# Patient Record
Sex: MALE | URBAN - METROPOLITAN AREA
[De-identification: names, ages, dates, MRNs, and addresses within clinical notes are randomized per-mention and may not be internally consistent; named-entity substitution may affect disease eponyms.]

---

## 2017-10-02 ENCOUNTER — IMPORTED ENCOUNTER (OUTPATIENT)
Dept: URBAN - METROPOLITAN AREA CLINIC 59 | Facility: CLINIC | Age: 47
End: 2017-10-02

## 2017-10-02 PROBLEM — H53.002 UNSPECIFIED AMBLYOPIA, LEFT EYE: Noted: 2017-10-02

## 2017-10-02 PROBLEM — H50.812 DUANE'S SYNDROME, LEFT EYE: Noted: 2017-10-02

## 2017-10-02 PROCEDURE — 92004 COMPRE OPH EXAM NEW PT 1/>: CPT

## 2023-10-14 ASSESSMENT — VISUAL ACUITY
OD_CC: 20/25
OS_CC: 20/40

## 2025-05-02 ENCOUNTER — PRE-ADMISSION TESTING (OUTPATIENT)
Dept: PREADMISSION TESTING | Age: 55
End: 2025-05-02
Payer: COMMERCIAL

## 2025-05-02 VITALS — HEIGHT: 60 IN | WEIGHT: 204 LBS | BODY MASS INDEX: 40.05 KG/M2

## 2025-05-02 RX ORDER — ARIPIPRAZOLE 2 MG/1
2.5 TABLET ORAL NIGHTLY
COMMUNITY

## 2025-05-02 RX ORDER — BUSPIRONE HYDROCHLORIDE 5 MG/1
5 TABLET ORAL 3 TIMES DAILY
COMMUNITY

## 2025-05-02 RX ORDER — LANOLIN ALCOHOL/MO/W.PET/CERES
400 CREAM (GRAM) TOPICAL DAILY
COMMUNITY

## 2025-05-02 RX ORDER — ACETAMINOPHEN 500 MG
125 TABLET ORAL DAILY
COMMUNITY

## 2025-05-02 RX ORDER — ASCORBIC ACID 500 MG
500 TABLET ORAL DAILY
COMMUNITY

## 2025-05-02 RX ORDER — ESCITALOPRAM OXALATE 20 MG/1
20 TABLET ORAL DAILY
COMMUNITY

## 2025-05-02 RX ORDER — FENOFIBRATE 67 MG/1
67 CAPSULE ORAL
COMMUNITY

## 2025-05-07 ENCOUNTER — APPOINTMENT (OUTPATIENT)
Dept: RADIOLOGY | Facility: HOSPITAL | Age: 55
Setting detail: HOSPITAL OUTPATIENT SURGERY
End: 2025-05-07
Attending: STUDENT IN AN ORGANIZED HEALTH CARE EDUCATION/TRAINING PROGRAM
Payer: COMMERCIAL

## 2025-05-07 ENCOUNTER — ANESTHESIA (OUTPATIENT)
Dept: OPERATING ROOM | Facility: HOSPITAL | Age: 55
Setting detail: HOSPITAL OUTPATIENT SURGERY
End: 2025-05-07
Payer: COMMERCIAL

## 2025-05-07 ENCOUNTER — HOSPITAL ENCOUNTER (OUTPATIENT)
Facility: HOSPITAL | Age: 55
Setting detail: HOSPITAL OUTPATIENT SURGERY
Discharge: HOME | End: 2025-05-07
Attending: STUDENT IN AN ORGANIZED HEALTH CARE EDUCATION/TRAINING PROGRAM | Admitting: STUDENT IN AN ORGANIZED HEALTH CARE EDUCATION/TRAINING PROGRAM
Payer: COMMERCIAL

## 2025-05-07 VITALS
DIASTOLIC BLOOD PRESSURE: 68 MMHG | TEMPERATURE: 97.7 F | HEIGHT: 60 IN | OXYGEN SATURATION: 95 % | BODY MASS INDEX: 40.05 KG/M2 | WEIGHT: 204 LBS | SYSTOLIC BLOOD PRESSURE: 127 MMHG | HEART RATE: 91 BPM | RESPIRATION RATE: 19 BRPM

## 2025-05-07 DIAGNOSIS — R97.20 ELEVATED PROSTATE SPECIFIC ANTIGEN (PSA): ICD-10-CM

## 2025-05-07 PROCEDURE — 71000001 HC PACU PHASE 1 INITIAL 30MIN: Performed by: STUDENT IN AN ORGANIZED HEALTH CARE EDUCATION/TRAINING PROGRAM

## 2025-05-07 PROCEDURE — 88305 TISSUE EXAM BY PATHOLOGIST: CPT | Performed by: STUDENT IN AN ORGANIZED HEALTH CARE EDUCATION/TRAINING PROGRAM

## 2025-05-07 PROCEDURE — 71000002 HC PACU PHASE 2 INITIAL 30MIN: Performed by: STUDENT IN AN ORGANIZED HEALTH CARE EDUCATION/TRAINING PROGRAM

## 2025-05-07 PROCEDURE — 36000005 HC OR LEVEL 5 INITIAL 30MIN: Performed by: STUDENT IN AN ORGANIZED HEALTH CARE EDUCATION/TRAINING PROGRAM

## 2025-05-07 PROCEDURE — 25800000 HC PHARMACY IV SOLUTIONS: Performed by: STUDENT IN AN ORGANIZED HEALTH CARE EDUCATION/TRAINING PROGRAM

## 2025-05-07 PROCEDURE — 36000015 HC OR LEVEL 5 EA ADDL MIN: Performed by: STUDENT IN AN ORGANIZED HEALTH CARE EDUCATION/TRAINING PROGRAM

## 2025-05-07 PROCEDURE — 27200000 HC STERILE SUPPLY: Performed by: STUDENT IN AN ORGANIZED HEALTH CARE EDUCATION/TRAINING PROGRAM

## 2025-05-07 PROCEDURE — 71000011 HC PACU PHASE 1 EA ADDL MIN: Performed by: STUDENT IN AN ORGANIZED HEALTH CARE EDUCATION/TRAINING PROGRAM

## 2025-05-07 PROCEDURE — 0VB07ZX EXCISION OF PROSTATE, VIA NATURAL OR ARTIFICIAL OPENING, DIAGNOSTIC: ICD-10-PCS | Performed by: STUDENT IN AN ORGANIZED HEALTH CARE EDUCATION/TRAINING PROGRAM

## 2025-05-07 PROCEDURE — 76942 ECHO GUIDE FOR BIOPSY: CPT | Mod: TC

## 2025-05-07 PROCEDURE — 63600000 HC DRUGS/DETAIL CODE: Mod: JZ | Performed by: NURSE ANESTHETIST, CERTIFIED REGISTERED

## 2025-05-07 PROCEDURE — 63600000 HC DRUGS/DETAIL CODE: Mod: JW | Performed by: NURSE ANESTHETIST, CERTIFIED REGISTERED

## 2025-05-07 PROCEDURE — 63600000 HC DRUGS/DETAIL CODE: Mod: JZ | Performed by: STUDENT IN AN ORGANIZED HEALTH CARE EDUCATION/TRAINING PROGRAM

## 2025-05-07 PROCEDURE — BV49ZZZ ULTRASONOGRAPHY OF PROSTATE AND SEMINAL VESICLES: ICD-10-PCS | Performed by: STUDENT IN AN ORGANIZED HEALTH CARE EDUCATION/TRAINING PROGRAM

## 2025-05-07 PROCEDURE — 37000002 HC ANESTHESIA MAC: Performed by: STUDENT IN AN ORGANIZED HEALTH CARE EDUCATION/TRAINING PROGRAM

## 2025-05-07 RX ORDER — CIPROFLOXACIN 500 MG/1
500 TABLET, FILM COATED ORAL 2 TIMES DAILY
COMMUNITY
Start: 2025-03-25

## 2025-05-07 RX ORDER — SODIUM CHLORIDE, SODIUM LACTATE, POTASSIUM CHLORIDE, CALCIUM CHLORIDE 600; 310; 30; 20 MG/100ML; MG/100ML; MG/100ML; MG/100ML
INJECTION, SOLUTION INTRAVENOUS CONTINUOUS PRN
Status: DISCONTINUED | OUTPATIENT
Start: 2025-05-07 | End: 2025-05-07 | Stop reason: SURG

## 2025-05-07 RX ORDER — FENTANYL CITRATE 50 UG/ML
INJECTION, SOLUTION INTRAMUSCULAR; INTRAVENOUS AS NEEDED
Status: DISCONTINUED | OUTPATIENT
Start: 2025-05-07 | End: 2025-05-07 | Stop reason: SURG

## 2025-05-07 RX ORDER — MIDAZOLAM HYDROCHLORIDE 2 MG/2ML
INJECTION, SOLUTION INTRAMUSCULAR; INTRAVENOUS AS NEEDED
Status: DISCONTINUED | OUTPATIENT
Start: 2025-05-07 | End: 2025-05-07 | Stop reason: SURG

## 2025-05-07 RX ORDER — PROPOFOL 200MG/20ML
SYRINGE (ML) INTRAVENOUS AS NEEDED
Status: DISCONTINUED | OUTPATIENT
Start: 2025-05-07 | End: 2025-05-07 | Stop reason: SURG

## 2025-05-07 RX ADMIN — MIDAZOLAM HYDROCHLORIDE 2 MG: 1 INJECTION, SOLUTION INTRAMUSCULAR; INTRAVENOUS at 11:49

## 2025-05-07 RX ADMIN — FENTANYL CITRATE 50 MCG: 50 INJECTION, SOLUTION INTRAMUSCULAR; INTRAVENOUS at 12:04

## 2025-05-07 RX ADMIN — PROPOFOL 30 MG: 10 INJECTION, EMULSION INTRAVENOUS at 12:09

## 2025-05-07 RX ADMIN — CEFTRIAXONE SODIUM 1 G: 1 INJECTION, POWDER, FOR SOLUTION INTRAMUSCULAR; INTRAVENOUS at 10:45

## 2025-05-07 RX ADMIN — FENTANYL CITRATE 50 MCG: 50 INJECTION, SOLUTION INTRAMUSCULAR; INTRAVENOUS at 12:16

## 2025-05-07 RX ADMIN — PROPOFOL 30 MG: 10 INJECTION, EMULSION INTRAVENOUS at 12:04

## 2025-05-07 RX ADMIN — SODIUM CHLORIDE, POTASSIUM CHLORIDE, SODIUM LACTATE AND CALCIUM CHLORIDE: 600; 310; 30; 20 INJECTION, SOLUTION INTRAVENOUS at 11:50

## 2025-05-07 RX ADMIN — PROPOFOL 30 MG: 10 INJECTION, EMULSION INTRAVENOUS at 12:13

## 2025-05-07 RX ADMIN — PROPOFOL 20 MG: 10 INJECTION, EMULSION INTRAVENOUS at 11:59

## 2025-05-07 RX ADMIN — PROPOFOL 50 MG: 10 INJECTION, EMULSION INTRAVENOUS at 12:19

## 2025-05-07 NOTE — DISCHARGE INSTRUCTIONS
If you have a fever, please call the office  If you cannot urinate, please call the office  Follow up in 1-2 weeks with dr. Lopez to discuss results

## 2025-05-07 NOTE — OP NOTE
Procedure(s):  ULTRASOUND GUIDED PROSTATE NEEDLE BIOPSY  Procedure Note    Ishan Friedman     [id number]    DATE OF PROCEDURE: 05/07/25       SURGEON: YVROSE Calderon MD    STAFF:   Circulator: Yessica Perez RN  Scrub Person: Caleb Krishna RN    PREOPERATIVE DIAGNOSES:   Elevated PSA    POSTOPERATIVE DIAGNOSES:   Same    PROCEDURES PERFORMED:  1. Transrectal prostate biopsy  2. Transrectal ultrasound of the prostate    CASE DESCRIPTION: clean contaminated    PERIOPERATIVE ANTIBIOTICS: Rocephin    INDICATIONS:This patient had a PSA of 8.   He subsequently had an MRI which showed a PIRADS 1 lesion in the prostate.  After discussion of management options the patient elected to proceed with the procedures listed above.    PROCEDURE NARRATIVE: The patient signed consent for the operation prior to being brought back to the operating room. Upon arrival on the operating room, a time out was performed for the patient's safety and the correct patient, procedure, site and side were identified. He was placed in a lateral decubitus position and sedation was administered.     The ultrasound probe was placed transrectally and the prostate was visualized.  TWe then proceeded with a standard 12-core biopsy.  The prostate was then visualized and no bleeding was seen.  The ultrasound probe was then removed.  This terminated the procedure.      The patient tolerated the procedure well.      INTRAOPERATIVE FINDINGS:  1. Prostate measured at 160 gm  2. No extraprostatic disease  3. Two cores taken for each specimen given large volume prostate    SPECIMENS:   ID Type Source Tests Collected by Time Destination   1 : Right Base Lateral Tissue Prostate PATHOLOGY TISSUE EXAM Willam Calderon MD 5/7/2025 1139    2 : Right Base Tissue Prostate PATHOLOGY TISSUE EXAM Willam Calderon MD 5/7/2025 1139    3 : Right [place] Lateral Tissue Prostate PATHOLOGY TISSUE EXAM Willam Calderon MD 5/7/2025 1139    4 : Right [place]  Tissue Prostate PATHOLOGY TISSUE EXAM Willam Calderon MD 5/7/2025 1139    5 : Right [place] Lateral Tissue Prostate PATHOLOGY TISSUE EXAM Willam Calderon MD 5/7/2025 1139    6 : Right [place] Tissue Prostate PATHOLOGY TISSUE EXAM Willam Calderon MD 5/7/2025 1139    7 : Left Base Lateral Tissue Prostate PATHOLOGY TISSUE EXAM Willam Calderon MD 5/7/2025 1139    8 : Left Base Tissue Prostate PATHOLOGY TISSUE EXAM Willam Calderon MD 5/7/2025 1139    9 : Left [place] Lateral Tissue Prostate PATHOLOGY TISSUE EXAM Willam Calderon MD 5/7/2025 1139    10 : Left [place] Tissue Prostate PATHOLOGY TISSUE EXAM Willam Calderon MD 5/7/2025 1139    11 : Left [place] Lateral Tissue Prostate PATHOLOGY TISSUE EXAM Willam Calderon MD 5/7/2025 1139    12 : Left [place] Tissue Prostate PATHOLOGY TISSUE EXAM Willam Calderon MD 5/7/2025 1139         DISPOSITION: The patient will be taken to the PACU for recovery and then discharge home.  He will follow up in 2 weeks for pathology review.     Electronically signed by: Willam Calderon MD   Date: 5/7/2025  Time: 11:53 AM

## 2025-05-07 NOTE — OR SURGEON
Pre-Procedure patient identification:  I am the primary operating surgeon/proceduralist and I have reviewed the applicable pathology reports and radiology studies for this procedure. I have identified the patient and confirmed laterality is generalized on 05/07/25 at 11:25 AM Willam Calderon MD  Phone Number: [phone]

## 2025-05-08 LAB
CASE RPRT: NORMAL
CLINICAL INFO: NORMAL
PATH REPORT.FINAL DX SPEC: NORMAL
PATH REPORT.GROSS SPEC: NORMAL

## (undated) DEVICE — GAUZE STERILE 4X4 16PLY

## (undated) DEVICE — NEEDLE BIOPSY MAXCORE 18GX20CM

## (undated) DEVICE — UNDERPAD DURASORB 30 X 30

## (undated) DEVICE — GOWN SIRUS NONRNF RAGLAN XL ST 30/CS

## (undated) DEVICE — GLOVE PROTEXIS PI ORTHO 7.5

## (undated) DEVICE — LUBRICATING JELLY 2OZ FLIP TOP TUBE

## (undated) DEVICE — CONTAINER FORMALIN PREFILLED 60ML

## (undated) DEVICE — STRAP POSITIONING 5X72" ONE PIECE DISP